# Patient Record
Sex: MALE | Race: WHITE | Employment: OTHER | URBAN - METROPOLITAN AREA
[De-identification: names, ages, dates, MRNs, and addresses within clinical notes are randomized per-mention and may not be internally consistent; named-entity substitution may affect disease eponyms.]

---

## 2019-03-07 ENCOUNTER — CLINICAL SUPPORT (OUTPATIENT)
Dept: CARDIAC REHAB | Facility: HOSPITAL | Age: 64
End: 2019-03-07

## 2019-03-07 DIAGNOSIS — Z95.828 S/P ASCENDING AORTIC REPLACEMENT: Primary | ICD-10-CM

## 2019-03-07 NOTE — PROGRESS NOTES
CARDIAC REHAB ASSESSMENT    Today's date: 2019  Patient name: Gonzalo Henderson     : 1955       MRN: [id number]  PCP: Sebastián Rosario DO  Referring Physician: Dr Loera   Cardiologist: Dr Loera   Surgeon: Dr Marcio Mckeon  Dx: S/P ascending aortic replacement due to anuerysm    Date of onset: 2019  Cultural needs: None    Height:  6'  Wt Readings from Last 1 Encounters:   No data found for Wt      Weight: 205 lb  Ht Readings from Last 1 Encounters:   No data found for Ht     Medical History: PMH  Past Medical History:   Diagnosis Date    Aneurysm [facility])   Thoracic Aortic Aneurysm    Aortic dilatation ([facility] [address] ) 10/17/2018    Atrial fibrillation ([facility] [address] ) 2018    Hypertension    Peyronie disease    Pneumonia 1269    Umbilical hernia     PSH  Past Surgical History:   Procedure Laterality Date    ASCENDING AORTIC ANEURYSM REPAIR N/A 2019   Dr Durga Tan  Axillary cutdown, circ arrest, #36 gelweave graft    CARDIAC CATHETERIZATION   no stents per pt    CARDIOVERSION     COLONOSCOPY    HERNIA REPAIR 2019   umbilical hernia    VASECTOMY         Physical Limitations: Sternal precautions only    Risk Factors   Cholesterol: No  Smoking: Former user  HTN: Yes  DM: No  Obesity: No   Inactivity: No  Family History:No family history on file  Allergies: Patient has no allergy information on record  ETOH:   Social History     Substance and Sexual Activity   Alcohol Use Not on file         Current Medications:   No current outpatient medications on file  No current facility-administered medications for this visit  Functional Status Prior to Diagnosis for Treatment   Occupation: part time job - [employer]  Recreation: Outdoor activities  Plays in a band  ADLs: No limitations  [place]: No limitations  Exercise: Patient normally performs an outdoor walking program  Patient also has weight equipment at home     Other:     Current Functional Status  Occupation: part time job -[employer]  Recreation: As above  ADLs:Capable of performing light ADLs only able to perform self-care resumed driving  Sebastian: Capable of performing light ADLs only resumed all ADLs within sternal precautions  Exercise: Patient agreeable to attend CR 3X week x 12 weeks for 36 sessions  Patient instructed to resume walking program on levels 15-30 minutes days off CR  Other:     Short Term Program Goals: increased strength improved energy/stamina with ADLs exercise 120-150 mins/wk return to work    Long Term Goals: increased maximal walking duration  increased intial training workload  Improved [facility] Activity Status score  Improved Quality of Life - [facility] score reduced  Reduced stress  improved Rate Your Plate Score    Ability to reach goals/rehabilitation potential:  Excellent    Projected return to function: 12 weeks  Objective tests: sub-max TM ETT      Nutritional   Reviewed details of Rate your Plate  Discussed key elements of heart healthy eating  Reviewed patient goals for dietary modifications and their clinical implications  Reviewed most recent lipid profile  Goals for dietary modification: increase fish intake  more meatless meals  increase whole grains  increase fruits and vegetables  low sodium  improved snack choices  more nuts/seeds  heathier choices while dining out      Emotional/Social  Patient reports he/she is coping well with good social support and denies depression or anxiety    SOCIAL SUPPORT NETWORK    Marital status:     Rate 1-5:    Marriage: 5   Family: 5   Financial: 5   Relationships: 5   Spirituality: 5   Intellectual: 5    Perceived Stress: 4/10   Stressors: Health   Goals for Stress Management: Decrease stress level to 3/10 or less    Domestic Violence Screening: No    Comments: Patient seen for initial evaluation s/p ascending aortic replacement due to aneurysm   Patient performed a SubMax ETT and achieved a 3 1 MET level  Patient rated the test a 3 on the 1-10 RPE scale  Patient denied any symptoms  Patient had correct hemodynamic responses to activity  Resting vitals: HR 77 and /58  Peak vitals:  and /66  Recovery vitals: HR 79 and /60  Patient performed test on room air, 02 saturation =/> 96% entire session  Upon observation of patient's incision, there are no signs of infection  Patient's goal is to return to work as soon as possible  Patient is an excellent rehabilitation candidate due to high prior level of function, excellent support system and willingness to progress  Patient's program will be progressed as he is able to tolerate  Patient will be educated specific to disease process

## 2019-03-07 NOTE — PROGRESS NOTES
Yelena Caballero 1955      Risk:      LOW/ MOD/ HIGH Moderate       Pre Post % change  Goal   Date: 3/7/2019      Physical           Sub Max ETT (mets) 3 1 METs  #VALUE! 10% increase   6MWT (feet) NA  #VALUE! 10% increase   [facility] Dyspnea Score NA  #VALUE! 5 pt decrease   Supplemental O2 use (L) 0      [facility] AI (est  peak O2) 24 2  -100 0%    COPD assessment Test (CAT) MNA   2 pt decrease   Peak exercise CR/ MN (mets) 3 1 METs  #VALUE! 40% increase   Emotional           PHQ 9  (> 10 refer to MD) 6  -100 0% 4 pt decrease   [facility] lower score = improvement     Total  22  -100 0% < 27   Feelings 2  -100 0% < 3   Physical fitness 4  -100 0% < 3   Social Support 4  0 0% < 3   Daily activities 2  -100 0% < 3   Social Activities 2  -100 0% < 3   Pain 2  -100 0% < 3   Overall Health 3  -100 0% < 3   Quality of Life 2  -100 0% < 3   Change in health 1  -100 0% < 3   Dietary           Rate your plate 47  -211 5% > 58   Measurements           Weight 205 lb  #VALUE! 2 5-5%    BMI 28 48  -100 0% 19-25   Waist Circ  41 inches  #VALUE! < 40 M / < 35 F   % Body fat NA  #VALUE! < 25 M / < 33 F    BP left arm     (systolic) 432  -734 7% < 864                              (diastolic) 58  -087 4% < 90   Smoking #/day (if applicable) quit 1910  #VALUE! 0   Lipids/ Glucose (Date)           Total cholesterol NA  #VALUE!    Triglycerides NA  #VALUE! < 150   HDL NA  #VALUE! 40-60   LDL NA  #VALUE! < 100   A1C % NA  #VALUE! 4 0 - 5 6%   Fasting BG NA  #VALUE!           Physician signature: _______________________ _________________     Date:

## 2019-03-07 NOTE — PROGRESS NOTES
Cardiac Rehabilitation Plan of Care   Care Plan       Today's date: 3/7/2019   Visits:   Patient name: Arpita Pratt      : 1955  Age: 70 y o  MRN: [id number]  Referring Physician: Dorys Hahn MD  Provider: Thomas Hidalgo  Clinician: Joshua Gutierrez, MPT    Dx: S/P ascending aortic replacement due to aneurysm  Date of onset: 2019      SUMMARY OF PROGRESS:  Patient seen for initial evaluation s/p ascending aortic replacement due to aneurysm  Patient performed a SubMax ETT and achieved a 3 1 MET level  Patient rated the test a 3 on the 1-10 RPE scale  Patient denied any symptoms  Patient had correct hemodynamic responses to activity  Resting vitals: HR 77 and /58  Peak vitals:  and /66  Recovery vitals: HR 79 and /60  Patient performed test on room air, 02 saturation =/> 96% entire session  Upon observation of patient's incision, there are no signs of infection  Patient's goal is to return to work as soon as possible  Patient is an excellent rehabilitation candidate due to high prior level of function, excellent support system and willingness to progress  Patient's program will be progressed as he is able to tolerate  Patient will be educated specific to disease process  Medication compliance: Yes   Comments: Patient admits to medication compliance  Fall Risk: Low   Comments: Patient exhibits Good dynamic standing balance and 5/5 BLE strength  EKG changes: No signs of ischemia or ectopy      EXERCISE ASSESSMENT and PLAN    Current Exercise Program in Rehab:       Frequency: 3 days/week        Minutes: 30-40         METS: 3 0 to 4 0           HR: 20-30 > RHR     RPE: 4-6         Modalities: Treadmill, UBE and NuStep      Exercise Progression 30 Day Goals :    Frequency: 5 days/week   Minutes: 40-45   METS: 4 0 to 5 0   HR: 20-30 > RHR      RPE: 4-6   Modalities: Treadmill, UBE and Lifecycle    Strength trainin-3 days / week, 12-15 repitations and 1-2 sets per modality    Modalities: Leg Press    Progressing: In Progress    Home Exercise: Patient normally performs a walking program  Patient also does various weighted exercises  Patient instructed to perform his walking program on levels for 15-30 minutes days off CR  Goals: 10% improvement in functional capacity, Reduced Dyspnea with physical activity  0-1/10, improved DASI score by 10%, Increase in peak CR METs by 40%, Resume ADLs with increased strength and Exercise 5 days/wk, >150mins/wk  Education: Benefit of exercise for CAD risk factors, home exercise guidelines, signs and sxs and RPE scale   Plan:education on home exercise guidelines, home exercise 30+ mins 2 days opposite CR and Education class: Risk Factors for Heart Disease  Readiness to change: Preparation      NUTRITION ASSESSMENT AND PLAN    Weight control:    Starting weight: 205 lb   Current weight:   205 lb  Waist circumference:    Startin   Current:  41  Diabetes: N/A  Lipid management: Discussed diet and lipid management  Goals:LDL <100, HDL >40, TRG <150, CHOL <200, reduced waist circumference <40 inches and Improved Rate Your Plate score  >35  Education: heart healthy eating  class: Heart Healthy Eating  class:  Label Reading  Progressing: In Progress  Plan: Education class: Reading Food Labels, Education Class: Heart Healthy Eating, Increase PUFA and MUFA, Increase whole grains, increase fruits/vegs and Reduce added sugars <25g/day  Readiness to change: Preparation      PSYCHOSOCIAL ASSESSMENT AND PLAN    Emotional:              5-9 = Mild Depression  Self-reported stress level: 5   Social support: Excellent  Goals:  Reduce perceived stress to 1-3/10, Physical Fitness in [facility] Score < 3, Daily Activity in [facility] Score < 3, Quality of Life in [facility] Score < 3 , Increased interest in doing things, improved sleep, improved positive thoughts of well being and increased energy  Education: signs/sxs of depression, benefits of positive support system and coping mechanisms  Progressing: In Progress  Plan: Class: Stress and Your Health and Class: Relaxation  Readiness to change: Preparation      OTHER CORE COMPONENTS     Tobacco:   Social History     Tobacco Use   Smoking Status Not on file       Tobacco Use Intervention: Referral to tobacco expert:   N/A quit     Blood pressure:    Restin/58   Exercise: 130/66   Recovery: 120/60    Goals: consistent BP < 130/80 and consistent exercise >150 mins/wk  Education:  understanding HTN and CAD and low sodium diet and HTN  Progressing: In Progress  Plan: Class: Understanding Heart Disease and Class: Common Heart Medications  Readiness to change: Preparation

## 2019-03-11 ENCOUNTER — CLINICAL SUPPORT (OUTPATIENT)
Dept: CARDIAC REHAB | Facility: HOSPITAL | Age: 64
End: 2019-03-11
Payer: COMMERCIAL

## 2019-03-11 DIAGNOSIS — Z95.828 S/P ASCENDING AORTIC REPLACEMENT: Primary | ICD-10-CM

## 2019-03-13 ENCOUNTER — CLINICAL SUPPORT (OUTPATIENT)
Dept: CARDIAC REHAB | Facility: HOSPITAL | Age: 64
End: 2019-03-13
Payer: COMMERCIAL

## 2019-03-13 DIAGNOSIS — Z95.828 S/P ASCENDING AORTIC REPLACEMENT: Primary | ICD-10-CM

## 2019-03-18 ENCOUNTER — CLINICAL SUPPORT (OUTPATIENT)
Dept: CARDIAC REHAB | Facility: HOSPITAL | Age: 64
End: 2019-03-18
Payer: COMMERCIAL

## 2019-03-18 DIAGNOSIS — Z95.828 S/P ASCENDING AORTIC REPLACEMENT: Primary | ICD-10-CM

## 2019-03-20 ENCOUNTER — APPOINTMENT (OUTPATIENT)
Dept: CARDIAC REHAB | Facility: HOSPITAL | Age: 64
End: 2019-03-20
Payer: COMMERCIAL

## 2019-03-21 ENCOUNTER — CLINICAL SUPPORT (OUTPATIENT)
Dept: CARDIAC REHAB | Facility: HOSPITAL | Age: 64
End: 2019-03-21
Payer: COMMERCIAL

## 2019-03-21 DIAGNOSIS — Z95.828 S/P ASCENDING AORTIC REPLACEMENT: Primary | ICD-10-CM

## 2019-03-21 NOTE — PROGRESS NOTES
Refer to attached telemetry tracings for today's session  Patient was educated on the following topics this date: Risk factor Reduction, Understanding Heart disease and Stress & Your health and Relaxation  Patient also issued handouts for all of the above

## 2019-03-25 ENCOUNTER — CLINICAL SUPPORT (OUTPATIENT)
Dept: CARDIAC REHAB | Facility: HOSPITAL | Age: 64
End: 2019-03-25
Payer: COMMERCIAL

## 2019-03-25 DIAGNOSIS — Z95.828 S/P ASCENDING AORTIC REPLACEMENT: Primary | ICD-10-CM

## 2019-03-27 ENCOUNTER — APPOINTMENT (OUTPATIENT)
Dept: CARDIAC REHAB | Facility: HOSPITAL | Age: 64
End: 2019-03-27
Payer: COMMERCIAL

## 2019-03-28 ENCOUNTER — CLINICAL SUPPORT (OUTPATIENT)
Dept: CARDIAC REHAB | Facility: HOSPITAL | Age: 64
End: 2019-03-28
Payer: COMMERCIAL

## 2019-03-28 DIAGNOSIS — Z95.828 S/P ASCENDING AORTIC REPLACEMENT: Primary | ICD-10-CM

## 2019-04-01 ENCOUNTER — CLINICAL SUPPORT (OUTPATIENT)
Dept: CARDIAC REHAB | Facility: HOSPITAL | Age: 64
End: 2019-04-01

## 2019-04-01 DIAGNOSIS — Z95.828 S/P ASCENDING AORTIC REPLACEMENT: Primary | ICD-10-CM

## 2019-04-04 ENCOUNTER — CLINICAL SUPPORT (OUTPATIENT)
Dept: CARDIAC REHAB | Facility: HOSPITAL | Age: 64
End: 2019-04-04

## 2019-04-04 DIAGNOSIS — Z95.828 S/P ASCENDING AORTIC REPLACEMENT: ICD-10-CM

## 2019-04-08 ENCOUNTER — CLINICAL SUPPORT (OUTPATIENT)
Dept: CARDIAC REHAB | Facility: HOSPITAL | Age: 64
End: 2019-04-08

## 2019-04-08 DIAGNOSIS — Z95.828 S/P ASCENDING AORTIC REPLACEMENT: Primary | ICD-10-CM

## 2019-04-11 ENCOUNTER — CLINICAL SUPPORT (OUTPATIENT)
Dept: CARDIAC REHAB | Facility: HOSPITAL | Age: 64
End: 2019-04-11

## 2019-04-11 DIAGNOSIS — Z95.828 S/P ASCENDING AORTIC REPLACEMENT: Primary | ICD-10-CM

## 2019-04-15 ENCOUNTER — CLINICAL SUPPORT (OUTPATIENT)
Dept: CARDIAC REHAB | Facility: HOSPITAL | Age: 64
End: 2019-04-15

## 2019-04-15 DIAGNOSIS — Z95.828 S/P ASCENDING AORTIC REPLACEMENT: Primary | ICD-10-CM

## 2019-04-18 ENCOUNTER — CLINICAL SUPPORT (OUTPATIENT)
Dept: CARDIAC REHAB | Facility: HOSPITAL | Age: 64
End: 2019-04-18

## 2019-04-18 DIAGNOSIS — Z95.828 S/P ASCENDING AORTIC REPLACEMENT: Primary | ICD-10-CM
